# Patient Record
Sex: FEMALE | Race: WHITE | NOT HISPANIC OR LATINO | Employment: STUDENT | URBAN - METROPOLITAN AREA
[De-identification: names, ages, dates, MRNs, and addresses within clinical notes are randomized per-mention and may not be internally consistent; named-entity substitution may affect disease eponyms.]

---

## 2017-04-02 ENCOUNTER — OFFICE VISIT (OUTPATIENT)
Dept: URGENT CARE | Facility: CLINIC | Age: 22
End: 2017-04-02
Payer: COMMERCIAL

## 2017-04-02 VITALS
TEMPERATURE: 98.2 F | RESPIRATION RATE: 14 BRPM | HEIGHT: 68 IN | BODY MASS INDEX: 24.25 KG/M2 | DIASTOLIC BLOOD PRESSURE: 68 MMHG | WEIGHT: 160 LBS | SYSTOLIC BLOOD PRESSURE: 110 MMHG | OXYGEN SATURATION: 98 % | HEART RATE: 74 BPM

## 2017-04-02 DIAGNOSIS — S05.02XA CORNEAL ABRASION, LEFT, INITIAL ENCOUNTER: ICD-10-CM

## 2017-04-02 PROCEDURE — 99204 OFFICE O/P NEW MOD 45 MIN: CPT | Performed by: PHYSICIAN ASSISTANT

## 2017-04-02 RX ORDER — OFLOXACIN 3 MG/ML
SOLUTION/ DROPS OPHTHALMIC
Qty: 5 ML | Refills: 1 | Status: SHIPPED | OUTPATIENT
Start: 2017-04-02

## 2017-04-02 ASSESSMENT — ENCOUNTER SYMPTOMS
EYE REDNESS: 1
GASTROINTESTINAL NEGATIVE: 1
DOUBLE VISION: 0
RESPIRATORY NEGATIVE: 1
NEUROLOGICAL NEGATIVE: 1
CONSTITUTIONAL NEGATIVE: 1
BLURRED VISION: 0
MUSCULOSKELETAL NEGATIVE: 1
PSYCHIATRIC NEGATIVE: 1
EYE DISCHARGE: 1
EYE PAIN: 0
PHOTOPHOBIA: 0
CARDIOVASCULAR NEGATIVE: 1

## 2017-04-02 ASSESSMENT — VISUAL ACUITY
OD_CC: 20/40
OS_CC: 20/25

## 2017-04-02 NOTE — PATIENT INSTRUCTIONS
Cool moist compresses to the left eye to help decrease swelling.  Abx eye drop every 2 hours.  No contact lens.  Return in 1-2 days for recheck.    Corneal Abrasion  The cornea is the clear covering at the front and center of the eye. When looking at the colored portion of the eye (iris), you are looking through the cornea. This very thin tissue is made up of many layers. The surface layer is a single layer of cells (corneal epithelium) and is one of the most sensitive tissues in the body. If a scratch or injury causes the corneal epithelium to come off, it is called a corneal abrasion. If the injury extends to the tissues below the epithelium, the condition is called a corneal ulcer.  CAUSES   · Scratches.  · Trauma.  · Foreign body in the eye.  Some people have recurrences of abrasions in the area of the original injury even after it has healed (recurrent erosion syndrome). Recurrent erosion syndrome generally improves and goes away with time.  SYMPTOMS   · Eye pain.  · Difficulty or inability to keep the injured eye open.  · The eye becomes very sensitive to light.  · Recurrent erosions tend to happen suddenly, first thing in the morning, usually after waking up and opening the eye.  DIAGNOSIS   Your health care provider can diagnose a corneal abrasion during an eye exam. Dye is usually placed in the eye using a drop or a small paper strip moistened by your tears. When the eye is examined with a special light, the abrasion shows up clearly because of the dye.  TREATMENT   · Small abrasions may be treated with antibiotic drops or ointment alone.  · A pressure patch may be put over the eye. If this is done, follow your doctor's instructions for when to remove the patch. Do not drive or use machines while the eye patch is on. Judging distances is hard to do with a patch on.  If the abrasion becomes infected and spreads to the deeper tissues of the cornea, a corneal ulcer can result. This is serious because it can  cause corneal scarring. Corneal scars interfere with light passing through the cornea and cause a loss of vision in the involved eye.  HOME CARE INSTRUCTIONS  · Use medicine or ointment as directed. Only take over-the-counter or prescription medicines for pain, discomfort, or fever as directed by your health care provider.  · Do not drive or operate machinery if your eye is patched. Your ability to  distances is impaired.  · If your health care provider has given you a follow-up appointment, it is very important to keep that appointment. Not keeping the appointment could result in a severe eye infection or permanent loss of vision. If there is any problem keeping the appointment, let your health care provider know.  SEEK MEDICAL CARE IF:   · You have pain, light sensitivity, and a scratchy feeling in one eye or both eyes.  · Your pressure patch keeps loosening up, and you can blink your eye under the patch after treatment.  · Any kind of discharge develops from the eye after treatment or if the lids stick together in the morning.  · You have the same symptoms in the morning as you did with the original abrasion days, weeks, or months after the abrasion healed.  MAKE SURE YOU:   · Understand these instructions.  · Will watch your condition.  · Will get help right away if you are not doing well or get worse.     This information is not intended to replace advice given to you by your health care provider. Make sure you discuss any questions you have with your health care provider.     Document Released: 12/15/2001 Document Revised: 12/23/2014 Document Reviewed: 08/25/2014  [employer] Patient Education ©2016 [employer]

## 2017-04-02 NOTE — Clinical Note
April 2, 2017         Patient: Marcelina Lott   YOB: 1995   Date of Visit: 4/2/2017           To Whom it May Concern:    Marcelina Lott was seen in my clinic on 4/2/2017. Please excuse her from work today.    If you have any questions or concerns, please don't hesitate to call.        Sincerely,           Juventino Monaco PA-C  Electronically Signed

## 2017-04-02 NOTE — PROGRESS NOTES
Subjective:      Marcelina Lott is a 21 y.o. female who presents with Eye Problem            Eye Problem   Associated symptoms include an eye discharge and eye redness. Pertinent negatives include no blurred vision, double vision or photophobia.     Chief Complaint   Patient presents with   • Eye Problem     l eye irritation x 2 days . was using apricot scrub on face , noticed after .needs work note .       HPI:  Marcelina Lott is a 21 y.o. female who presents with left eye irritation x 2 days.  Using apricot scrub which she has used before.  Believes she did have a fb in the left eye.  Yesterday did have gritty, fb sensation.  Swelling occurred yesterday.  Swelling is lightly improved.  No pain with light.  No goopy discharge.  It is tearing .  Does wear contacts but not currently.  Patient denies HA, SOB, chest pain, palpitations, fever, chills, or n/v/d.    No change in vision.    No past medical history on file.    No past surgical history on file.    No family history on file.    Social History     Social History   • Marital Status: Single     Spouse Name: N/A   • Number of Children: N/A   • Years of Education: N/A     Occupational History   • Not on file.     Social History Main Topics   • Smoking status: Not on file   • Smokeless tobacco: Not on file   • Alcohol Use: Not on file   • Drug Use: Not on file   • Sexual Activity: Not on file     Other Topics Concern   • Not on file     Social History Narrative   • No narrative on file       No current outpatient prescriptions on file.    Allergies   Allergen Reactions   • Pcn [Penicillins]             Review of Systems   Constitutional: Negative.    HENT: Negative.    Eyes: Positive for discharge and redness. Negative for blurred vision, double vision, photophobia and pain.   Respiratory: Negative.    Cardiovascular: Negative.    Gastrointestinal: Negative.    Genitourinary: Negative.    Musculoskeletal: Negative.    Skin: Negative.    Neurological:  "Negative.    Endo/Heme/Allergies: Negative.    Psychiatric/Behavioral: Negative.           Objective:     /68 mmHg  Pulse 74  Temp(Src) 36.8 °C (98.2 °F)  Resp 14  Ht 1.727 m (5' 8\")  Wt 72.576 kg (160 lb)  BMI 24.33 kg/m2  SpO2 98%  LMP 04/02/2017  Breastfeeding? No     Physical Exam   Eyes:              Nursing notes and vitals reviewed.    Constitutional:   Appropriately groomed, pleasant affect, well nourished, in NAD.    Head:   Normocephalic, atraumatic.    Eyes:   PERRLA, EOM's full,  Left eye: sclera injected, conjunctiva erythematous, and medial canthus without exudate.  No preauricular lymphadenopathy.  Mild superior eyelid edema without skin color changes.    No foreign body identified.  Small 2-3mm triangular area of uptake with fluorescein dye superior aspect of the cornea.  Eye irrigated copiously with sterile saline.  Patient tolerated well.    Ears:  Hearing grossly intact to voice.    Nose:  Nares patent bilaterally.  Nasal mucosa not edematous.      Throat:  Dentition wnl, mucosa moist without lesions.  Oropharynx not erythematous, with no enlargement of the palatine tonsils bilaterally with no exudates.    No post nasal drainage present.  Soft palate rises symmetrically bilaterally and uvula midline.      Neck: Neck supple, with mild anterior lymphadenopathy that is soft and mobile to palpation. Thyroid non-palpable without tenderness or nodules. No supraclavicular lymphadenopathy.    Lungs:  Respiratory effort not labored without accessory muscle use.     Musculoskeletal:  Gait non-antalgic with a narrow base.    Derm:  Skin without rashes or lesions with good turgor pressure.      Psychiatric:  Mood, affect, and judgement appropriate.        Assessment/Plan:     1. Corneal abrasion, left, initial encounter  Patient presents with small 2-3mm corneal abrasion that occurred on Friday.  Suspect area was larger given it has been 48 hours.  Rx ofloxacin as patient is a contact lens " wearer.  Advised no contact lenses at this time.  RTC tomorrow for recheck.  Consider opthalmology referral if not improving in size.    Patient was in agreement with this treatment plan and seemed to understand without barriers. All questions were encouraged and answered.  Reviewed signs and symptoms of when to seek emergency medical care.     - ofloxacin (OCUFLOX) 0.3 % Solution; 1-2 DROPS TO AFFECTED EYE EVERY 2-4 HOURS WHILE AWAKE. USE FOR ONE EXTRA DAY AFTER BETTER.  Dispense: 5 mL; Refill: 1